# Patient Record
Sex: FEMALE | Race: WHITE | NOT HISPANIC OR LATINO
[De-identification: names, ages, dates, MRNs, and addresses within clinical notes are randomized per-mention and may not be internally consistent; named-entity substitution may affect disease eponyms.]

---

## 2017-05-24 ENCOUNTER — RESULT REVIEW (OUTPATIENT)
Age: 27
End: 2017-05-24

## 2019-10-21 ENCOUNTER — APPOINTMENT (OUTPATIENT)
Dept: ULTRASOUND IMAGING | Facility: CLINIC | Age: 29
End: 2019-10-21
Payer: COMMERCIAL

## 2019-10-21 ENCOUNTER — APPOINTMENT (OUTPATIENT)
Dept: RADIOLOGY | Facility: CLINIC | Age: 29
End: 2019-10-21
Payer: COMMERCIAL

## 2019-10-21 ENCOUNTER — OUTPATIENT (OUTPATIENT)
Dept: OUTPATIENT SERVICES | Facility: HOSPITAL | Age: 29
LOS: 1 days | End: 2019-10-21
Payer: COMMERCIAL

## 2019-10-21 DIAGNOSIS — Z00.8 ENCOUNTER FOR OTHER GENERAL EXAMINATION: ICD-10-CM

## 2019-10-21 PROCEDURE — 71110 X-RAY EXAM RIBS BIL 3 VIEWS: CPT | Mod: 26

## 2019-10-21 PROCEDURE — 71046 X-RAY EXAM CHEST 2 VIEWS: CPT | Mod: 26

## 2019-10-21 PROCEDURE — 76700 US EXAM ABDOM COMPLETE: CPT | Mod: 26

## 2019-10-21 PROCEDURE — 71046 X-RAY EXAM CHEST 2 VIEWS: CPT

## 2019-10-21 PROCEDURE — 71110 X-RAY EXAM RIBS BIL 3 VIEWS: CPT

## 2019-10-21 PROCEDURE — 76700 US EXAM ABDOM COMPLETE: CPT

## 2020-11-09 ENCOUNTER — TRANSCRIPTION ENCOUNTER (OUTPATIENT)
Age: 30
End: 2020-11-09

## 2021-06-10 ENCOUNTER — APPOINTMENT (OUTPATIENT)
Dept: PLASTIC SURGERY | Facility: CLINIC | Age: 31
End: 2021-06-10
Payer: COMMERCIAL

## 2021-06-10 ENCOUNTER — RESULT REVIEW (OUTPATIENT)
Age: 31
End: 2021-06-10

## 2021-06-10 VITALS
HEART RATE: 79 BPM | TEMPERATURE: 98.3 F | SYSTOLIC BLOOD PRESSURE: 136 MMHG | DIASTOLIC BLOOD PRESSURE: 75 MMHG | BODY MASS INDEX: 22.49 KG/M2 | WEIGHT: 135 LBS | OXYGEN SATURATION: 100 % | HEIGHT: 65 IN

## 2021-06-10 DIAGNOSIS — Z98.82 BREAST IMPLANT STATUS: ICD-10-CM

## 2021-06-10 PROCEDURE — 99072 ADDL SUPL MATRL&STAF TM PHE: CPT

## 2021-06-10 PROCEDURE — 99204 OFFICE O/P NEW MOD 45 MIN: CPT

## 2021-06-15 PROBLEM — Z98.82 HISTORY OF BREAST AUGMENTATION: Status: ACTIVE | Noted: 2021-06-15

## 2021-06-16 RX ORDER — ESCITALOPRAM OXALATE 5 MG/1
TABLET, FILM COATED ORAL
Refills: 0 | Status: ACTIVE | COMMUNITY

## 2021-07-08 ENCOUNTER — APPOINTMENT (OUTPATIENT)
Dept: CT IMAGING | Facility: HOSPITAL | Age: 31
End: 2021-07-08
Payer: COMMERCIAL

## 2021-07-08 ENCOUNTER — OUTPATIENT (OUTPATIENT)
Dept: OUTPATIENT SERVICES | Facility: HOSPITAL | Age: 31
LOS: 1 days | End: 2021-07-08
Payer: COMMERCIAL

## 2021-07-08 DIAGNOSIS — R19.02 LEFT UPPER QUADRANT ABDOMINAL SWELLING, MASS AND LUMP: ICD-10-CM

## 2021-07-08 PROCEDURE — 74177 CT ABD & PELVIS W/CONTRAST: CPT | Mod: 26

## 2021-07-08 PROCEDURE — 74177 CT ABD & PELVIS W/CONTRAST: CPT

## 2021-10-21 ENCOUNTER — RESULT REVIEW (OUTPATIENT)
Age: 31
End: 2021-10-21

## 2022-01-12 ENCOUNTER — OUTPATIENT (OUTPATIENT)
Dept: OUTPATIENT SERVICES | Facility: HOSPITAL | Age: 32
LOS: 1 days | End: 2022-01-12
Payer: COMMERCIAL

## 2022-01-12 ENCOUNTER — APPOINTMENT (OUTPATIENT)
Dept: RADIOLOGY | Facility: CLINIC | Age: 32
End: 2022-01-12
Payer: COMMERCIAL

## 2022-01-12 DIAGNOSIS — Z00.8 ENCOUNTER FOR OTHER GENERAL EXAMINATION: ICD-10-CM

## 2022-01-12 PROCEDURE — 71046 X-RAY EXAM CHEST 2 VIEWS: CPT

## 2022-01-12 PROCEDURE — 71046 X-RAY EXAM CHEST 2 VIEWS: CPT | Mod: 26

## 2022-05-09 ENCOUNTER — APPOINTMENT (OUTPATIENT)
Dept: ULTRASOUND IMAGING | Facility: CLINIC | Age: 32
End: 2022-05-09
Payer: COMMERCIAL

## 2022-05-09 PROCEDURE — 76856 US EXAM PELVIC COMPLETE: CPT

## 2022-05-09 PROCEDURE — 76700 US EXAM ABDOM COMPLETE: CPT

## 2022-05-09 PROCEDURE — 76830 TRANSVAGINAL US NON-OB: CPT

## 2022-09-29 ENCOUNTER — APPOINTMENT (OUTPATIENT)
Dept: PLASTIC SURGERY | Facility: CLINIC | Age: 32
End: 2022-09-29

## 2022-09-29 VITALS
WEIGHT: 140 LBS | SYSTOLIC BLOOD PRESSURE: 131 MMHG | HEART RATE: 70 BPM | OXYGEN SATURATION: 99 % | BODY MASS INDEX: 23.32 KG/M2 | HEIGHT: 65 IN | TEMPERATURE: 98.7 F | DIASTOLIC BLOOD PRESSURE: 80 MMHG

## 2022-09-29 DIAGNOSIS — R19.02 LEFT UPPER QUADRANT ABDOMINAL SWELLING, MASS AND LUMP: ICD-10-CM

## 2022-09-29 PROCEDURE — 99214 OFFICE O/P EST MOD 30 MIN: CPT

## 2022-10-18 ENCOUNTER — OUTPATIENT (OUTPATIENT)
Dept: OUTPATIENT SERVICES | Facility: HOSPITAL | Age: 32
LOS: 1 days | End: 2022-10-18
Payer: COMMERCIAL

## 2022-10-18 VITALS
SYSTOLIC BLOOD PRESSURE: 123 MMHG | OXYGEN SATURATION: 100 % | HEIGHT: 65 IN | DIASTOLIC BLOOD PRESSURE: 80 MMHG | RESPIRATION RATE: 12 BRPM | TEMPERATURE: 99 F | HEART RATE: 73 BPM | WEIGHT: 141.54 LBS

## 2022-10-18 DIAGNOSIS — R19.02 LEFT UPPER QUADRANT ABDOMINAL SWELLING, MASS AND LUMP: ICD-10-CM

## 2022-10-18 DIAGNOSIS — Z90.49 ACQUIRED ABSENCE OF OTHER SPECIFIED PARTS OF DIGESTIVE TRACT: Chronic | ICD-10-CM

## 2022-10-18 DIAGNOSIS — Z01.818 ENCOUNTER FOR OTHER PREPROCEDURAL EXAMINATION: ICD-10-CM

## 2022-10-18 DIAGNOSIS — Z98.82 BREAST IMPLANT STATUS: Chronic | ICD-10-CM

## 2022-10-18 PROCEDURE — G0463: CPT

## 2022-10-18 RX ORDER — SODIUM CHLORIDE 9 MG/ML
1000 INJECTION, SOLUTION INTRAVENOUS
Refills: 0 | Status: DISCONTINUED | OUTPATIENT
Start: 2022-10-26 | End: 2022-11-09

## 2022-10-18 NOTE — H&P PST ADULT - RESPIRATORY
normal/clear to auscultation bilaterally/no wheezes/no rales/no rhonchi/no respiratory distress/airway patent/good air movement/respirations non-labored

## 2022-10-18 NOTE — H&P PST ADULT - PROBLEM SELECTOR PLAN 1
excision left abdominal mass possible local flap. surgical wash instructions reviewed and verbalized understanding. may take xanax AM if surgery if needed. covid PCR scheduled for 10/24/22 in [place]

## 2022-10-18 NOTE — H&P PST ADULT - HISTORY OF PRESENT ILLNESS
31 yo female presents with 10 year history of abdominal soft tissue mass. She reports that recently the mass has increased in size and she now has discomfort when exercising.

## 2022-10-18 NOTE — H&P PST ADULT - FALL HARM RISK - UNIVERSAL INTERVENTIONS
Bed in lowest position, wheels locked, appropriate side rails in place/Call bell, personal items and telephone in reach/Instruct patient to call for assistance before getting out of bed or chair/Non-slip footwear when patient is out of bed/Jelm to call system/Physically safe environment - no spills, clutter or unnecessary equipment/Purposeful Proactive Rounding/Room/bathroom lighting operational, light cord in reach

## 2022-10-18 NOTE — H&P PST ADULT - NSANTHOSAYNRD_GEN_A_CORE
neck 14 inches/No. MATILDA screening performed.  STOP BANG Legend: 0-2 = LOW Risk; 3-4 = INTERMEDIATE Risk; 5-8 = HIGH Risk

## 2022-10-18 NOTE — H&P PST ADULT - NSICDXPASTSURGICALHX_GEN_ALL_CORE_FT
PAST SURGICAL HISTORY:  History of breast augmentation 2007 bilateral    History of laparoscopic cholecystectomy 2010

## 2022-10-24 LAB — SARS-COV-2 N GENE NPH QL NAA+PROBE: NOT DETECTED

## 2022-10-25 ENCOUNTER — TRANSCRIPTION ENCOUNTER (OUTPATIENT)
Age: 32
End: 2022-10-25

## 2022-10-25 VITALS
OXYGEN SATURATION: 97 % | TEMPERATURE: 98 F | HEART RATE: 80 BPM | RESPIRATION RATE: 14 BRPM | DIASTOLIC BLOOD PRESSURE: 75 MMHG | HEIGHT: 65 IN | SYSTOLIC BLOOD PRESSURE: 117 MMHG | WEIGHT: 141.54 LBS

## 2022-10-25 NOTE — ASU PATIENT PROFILE, ADULT - NSICDXPASTMEDICALHX_GEN_ALL_CORE_FT
PAST MEDICAL HISTORY:  Anxiety     COVID-19 vaccine series completed     History of COVID-19 December 2021, flu-like symptoms and no hospitalization    History of palpitations anxiety related, echo and EKGnormal    Left upper quadrant abdominal mass

## 2022-10-26 ENCOUNTER — APPOINTMENT (OUTPATIENT)
Dept: PLASTIC SURGERY | Facility: HOSPITAL | Age: 32
End: 2022-10-26

## 2022-10-26 ENCOUNTER — RESULT REVIEW (OUTPATIENT)
Age: 32
End: 2022-10-26

## 2022-10-26 ENCOUNTER — TRANSCRIPTION ENCOUNTER (OUTPATIENT)
Age: 32
End: 2022-10-26

## 2022-10-26 ENCOUNTER — OUTPATIENT (OUTPATIENT)
Dept: OUTPATIENT SERVICES | Facility: HOSPITAL | Age: 32
LOS: 1 days | End: 2022-10-26
Payer: COMMERCIAL

## 2022-10-26 VITALS
OXYGEN SATURATION: 98 % | RESPIRATION RATE: 15 BRPM | TEMPERATURE: 98 F | HEART RATE: 78 BPM | DIASTOLIC BLOOD PRESSURE: 69 MMHG | SYSTOLIC BLOOD PRESSURE: 109 MMHG

## 2022-10-26 DIAGNOSIS — Z90.49 ACQUIRED ABSENCE OF OTHER SPECIFIED PARTS OF DIGESTIVE TRACT: Chronic | ICD-10-CM

## 2022-10-26 DIAGNOSIS — Z98.82 BREAST IMPLANT STATUS: Chronic | ICD-10-CM

## 2022-10-26 DIAGNOSIS — R19.02 LEFT UPPER QUADRANT ABDOMINAL SWELLING, MASS AND LUMP: ICD-10-CM

## 2022-10-26 PROBLEM — Z86.16 PERSONAL HISTORY OF COVID-19: Chronic | Status: ACTIVE | Noted: 2022-10-18

## 2022-10-26 PROBLEM — F41.9 ANXIETY DISORDER, UNSPECIFIED: Chronic | Status: ACTIVE | Noted: 2022-10-18

## 2022-10-26 PROBLEM — Z87.898 PERSONAL HISTORY OF OTHER SPECIFIED CONDITIONS: Chronic | Status: ACTIVE | Noted: 2022-10-18

## 2022-10-26 PROBLEM — Z92.29 PERSONAL HISTORY OF OTHER DRUG THERAPY: Chronic | Status: ACTIVE | Noted: 2022-10-18

## 2022-10-26 PROCEDURE — 88302 TISSUE EXAM BY PATHOLOGIST: CPT

## 2022-10-26 PROCEDURE — 14000 TIS TRNFR TRUNK 10 SQ CM/<: CPT

## 2022-10-26 PROCEDURE — 49560: CPT | Mod: AS

## 2022-10-26 PROCEDURE — 49561: CPT

## 2022-10-26 PROCEDURE — 88302 TISSUE EXAM BY PATHOLOGIST: CPT | Mod: 26

## 2022-10-26 PROCEDURE — 49568: CPT

## 2022-10-26 RX ORDER — ESCITALOPRAM OXALATE 10 MG/1
1 TABLET, FILM COATED ORAL
Qty: 0 | Refills: 0 | DISCHARGE

## 2022-10-26 RX ORDER — SODIUM CHLORIDE 9 MG/ML
1000 INJECTION, SOLUTION INTRAVENOUS
Refills: 0 | Status: DISCONTINUED | OUTPATIENT
Start: 2022-10-26 | End: 2022-10-26

## 2022-10-26 RX ORDER — ALPRAZOLAM 0.25 MG
1 TABLET ORAL
Qty: 0 | Refills: 0 | DISCHARGE

## 2022-10-26 RX ORDER — TRAMADOL HYDROCHLORIDE 50 MG/1
50 TABLET ORAL ONCE
Refills: 0 | Status: DISCONTINUED | OUTPATIENT
Start: 2022-10-26 | End: 2022-10-26

## 2022-10-26 RX ORDER — NORETHINDRONE AND ETHINYL ESTRADIOL 0.4-0.035
1 KIT ORAL
Qty: 0 | Refills: 0 | DISCHARGE

## 2022-10-26 RX ORDER — SODIUM CHLORIDE 9 MG/ML
1000 INJECTION, SOLUTION INTRAVENOUS
Refills: 0 | Status: DISCONTINUED | OUTPATIENT
Start: 2022-10-26 | End: 2022-11-09

## 2022-10-26 RX ORDER — HYDROMORPHONE HYDROCHLORIDE 2 MG/ML
0.5 INJECTION INTRAMUSCULAR; INTRAVENOUS; SUBCUTANEOUS
Refills: 0 | Status: DISCONTINUED | OUTPATIENT
Start: 2022-10-26 | End: 2022-10-26

## 2022-10-26 RX ORDER — ONDANSETRON 8 MG/1
4 TABLET, FILM COATED ORAL ONCE
Refills: 0 | Status: COMPLETED | OUTPATIENT
Start: 2022-10-26 | End: 2022-10-26

## 2022-10-26 RX ADMIN — ONDANSETRON 4 MILLIGRAM(S): 8 TABLET, FILM COATED ORAL at 13:07

## 2022-10-26 NOTE — ASU DISCHARGE PLAN (ADULT/PEDIATRIC) - CARE PROVIDER_API CALL
Ariela Cline)  Physician Assistant Services  [address] [facility], [address]  [place] [address]  Phone: [phone]  Fax: [phone]  Scheduled Appointment: 11/03/2022 03:30 PM

## 2022-10-26 NOTE — ASU DISCHARGE PLAN (ADULT/PEDIATRIC) - PROCEDURE
Excisional biopsy of abdominal mass - by Dr. Madrigal ([phone]). Excisional biopsy of abdominal mass - by Dr. Madrigal ([phone]).

## 2022-10-26 NOTE — ASU DISCHARGE PLAN (ADULT/PEDIATRIC) - ASU DC SPECIAL INSTRUCTIONSFT
1. Please follow up with Dr. Madrigal's PA, Clemencia, next week THURSDAY 11/03/22 3:30pm for your first post operative visit. Your appointment has already been made. Please call the office if you need to make any changes to your appointment at [phone] (during normal business hours M-F 9-5pm).   -  2. Activity:   Please avoid any strenuous activities, AVOID exercise, stretching, reaching overhead, or any heavy lifting.  -  3. Dressings:   Some OOZING and blood on the dressing is normal and to be expected. If it becomes saturated w/ BRIGHT red blood that does not stop, please call [phone] for email CLEMENCIA: [email or web address].  -  4. Medications:  Your medications were sent to your pharmacy.  Please take the prescribed medications as directed.   For MILD pain please take regular over the counter pain medications such as: Advil, Tylenol, Motrin.   Only take the narcotic prescribed pain medication for SEVERE PAIN.   If constipation occurs after taking narcotic pain medications, please take an over the counter stool softener.

## 2022-10-26 NOTE — ASU DISCHARGE PLAN (ADULT/PEDIATRIC) - BATHING
you may take quick showers starting tomorrow because the dressing you have on is waterproof./No change

## 2022-10-26 NOTE — ASU DISCHARGE PLAN (ADULT/PEDIATRIC) - PAIN MANAGEMENT
Prescriptions electronically submitted to pharmacy from [facility] Percocet 5/325mg, Q6 PRN./Prescriptions electronically submitted to pharmacy from [facility]

## 2022-10-26 NOTE — ASU DISCHARGE PLAN (ADULT/PEDIATRIC) - COMMENTS
Dr. Madrigal's  direct phone number is [phone]. If after office hours (9-5PM M-F), then please wait until normal business hours to call.   You may leave a detailed VM as well. You may also email [email or web address] for any concerns or questions regarding scheduling appointments.  You will see Dr. Madrigal's NEAL Khanna, (Ariela DE JESUS) for your post operative visit.  You may also contact her directly via email: [email or web address] for any concerns or questions.

## 2022-11-01 LAB — SURGICAL PATHOLOGY STUDY: SIGNIFICANT CHANGE UP

## 2022-11-03 ENCOUNTER — APPOINTMENT (OUTPATIENT)
Dept: PLASTIC SURGERY | Facility: CLINIC | Age: 32
End: 2022-11-03

## 2022-11-03 VITALS
HEART RATE: 79 BPM | BODY MASS INDEX: 23.32 KG/M2 | DIASTOLIC BLOOD PRESSURE: 63 MMHG | OXYGEN SATURATION: 97 % | HEIGHT: 65 IN | TEMPERATURE: 98 F | SYSTOLIC BLOOD PRESSURE: 127 MMHG | WEIGHT: 140 LBS

## 2022-11-03 DIAGNOSIS — K46.9 UNSPECIFIED ABDOMINAL HERNIA W/OUT OBSTRUCTION OR GANGRENE: ICD-10-CM

## 2022-11-03 PROCEDURE — 99024 POSTOP FOLLOW-UP VISIT: CPT

## 2022-11-21 ENCOUNTER — NON-APPOINTMENT (OUTPATIENT)
Age: 32
End: 2022-11-21

## 2022-11-22 ENCOUNTER — APPOINTMENT (OUTPATIENT)
Dept: PLASTIC SURGERY | Facility: CLINIC | Age: 32
End: 2022-11-22

## 2023-04-02 ENCOUNTER — NON-APPOINTMENT (OUTPATIENT)
Age: 33
End: 2023-04-02

## 2023-12-18 NOTE — ASU DISCHARGE PLAN (ADULT/PEDIATRIC) - ACTIVITY LEVEL
Subjective   Patient ID: Roney is a 49 year old male.  Referring provider is Markos Guerrero MD.    Chief Complaint   Patient presents with    Follow-up     S/p Right lateral suboccipital craniectomy for resection of mass lesion done at [facility] on 7/5/23 (166d). Here to review MRI brain done 12/11/23 through [facility].    Office Visit     Denies any headaches, vomiting, changes to smell, ringing in ears, changes to speech or thinking. He reports some nausea. He still taking Decadron.     Pt here with family for f/u to review most recent MRI brain, no HA, N/V or weakness        Patient's medications, allergies, past medical, surgical, social and family histories were reviewed and updated as appropriate.    Review of Systems   All other systems reviewed and are negative.      Objective   Physical Exam  Vitals and nursing note reviewed. Exam conducted with a chaperone present.   Constitutional:       General: He is awake.      Appearance: Normal appearance.   HENT:      Neck: Full passive range of motion without pain.   Musculoskeletal:      Lumbar back: Normal.   Neurological:      Mental Status: He is alert and oriented to person, place, and time.      Sensory: Sensation is intact.      Motor: Motor function is intact.      Coordination: Romberg sign positive.      Gait: Gait abnormal.      Deep Tendon Reflexes: Reflexes are normal and symmetric.       Neurological Exam  Mental Status  Awake and alert. Oriented to person, place, and time.    Sensory  Normal sensation.    Reflexes  Deep tendon reflexes are 2+ and symmetric in all four extremities.    Gait   Abnormal gait. Romberg is present.      Imaging/Labs  I reviewed MRI brain Dec 2023 compared to Nov 2023 showing stable post op findings in right posterior fossa, smaller size of known pontine lesion dn satellite cerebellar lesion compared to prior c/w treatment effect form chemo and XRT, stable size right medial temporal lesion    Assessment   Problem List Items Addressed  This Visit          Hematology and Neoplasia    Metastatic cancer to brain (CMD) - Primary     Pt with stable appearing medial right temporal lobe CE cystic lesion  DDX metastasis lesion, abscess, primary CNS tumor  Given his h/o of known metastatic colon CA it is most likely another location of metastatic disease  I do not recommend biopsy given stability of the lesion of serial imaging  I recommend continued serial image for f/u in 4-6 weeks  Pt counseled to return if new symptoms develop         Relevant Orders    MRI BRAIN W WO CONTRAST     .   No excercise/No heavy lifting/No sports/gym/No tub baths/No intercourse please avoid sleeping on your stomach./No excercise/No heavy lifting/No sports/gym/No tub baths/No intercourse

## 2023-12-22 PROBLEM — Z00.00 ENCOUNTER FOR PREVENTIVE HEALTH EXAMINATION: Status: ACTIVE | Noted: 2023-12-22

## 2024-02-12 ENCOUNTER — APPOINTMENT (OUTPATIENT)
Dept: INTERNAL MEDICINE | Facility: CLINIC | Age: 34
End: 2024-02-12

## 2024-09-11 ENCOUNTER — APPOINTMENT (OUTPATIENT)
Dept: PULMONOLOGY | Facility: CLINIC | Age: 34
End: 2024-09-11
Payer: COMMERCIAL

## 2024-09-11 VITALS
OXYGEN SATURATION: 95 % | WEIGHT: 140 LBS | HEART RATE: 80 BPM | DIASTOLIC BLOOD PRESSURE: 80 MMHG | BODY MASS INDEX: 23.32 KG/M2 | SYSTOLIC BLOOD PRESSURE: 116 MMHG | HEIGHT: 65 IN

## 2024-09-11 DIAGNOSIS — R05.9 COUGH, UNSPECIFIED: ICD-10-CM

## 2024-09-11 LAB — POCT - HEMOGLOBIN (HGB), QUANTITATIVE, TRANSCUTANEOUS: 13.4

## 2024-09-11 PROCEDURE — 94010 BREATHING CAPACITY TEST: CPT

## 2024-09-11 PROCEDURE — 88738 HGB QUANT TRANSCUTANEOUS: CPT

## 2024-09-11 PROCEDURE — ZZZZZ: CPT

## 2024-09-11 PROCEDURE — 99204 OFFICE O/P NEW MOD 45 MIN: CPT | Mod: 25

## 2024-09-11 PROCEDURE — 94727 GAS DIL/WSHOT DETER LNG VOL: CPT

## 2024-09-11 PROCEDURE — 94729 DIFFUSING CAPACITY: CPT

## 2024-09-11 RX ORDER — FLUTICASONE FUROATE 200 UG/1
200 POWDER RESPIRATORY (INHALATION) DAILY
Qty: 2 | Refills: 2 | Status: ACTIVE | COMMUNITY
Start: 2024-09-11 | End: 1900-01-01

## 2024-09-11 NOTE — PROCEDURE
[FreeTextEntry1] : cxr at  reportedly normal per pt  PFT: Normal spirometry.  Lung volumes normal. DLCO normal.    Reviewed:  EXAM: XR CHEST PA LAT 2V   PROCEDURE DATE: 01/12/2022    INTERPRETATION: PA and lateral chest radiographs  COMPARISON: 10/21/2019 chest.  CLINICAL INFORMATION: Cough, shortness of breath.  FINDINGS:  The airway is midline. There are no airspace consolidations. No pleural effusion or pneumothorax.  The heart size and mediastinum configuration are within the limits of normal.  The visualized osseus structures are intact.  IMPRESSION:  No acute radiographic cardiopulmonary pathology.  --- End of Report ---       TWAN BERUMEN MD; Attending Radiologist This document has been electronically signed. Jan 12 2022 8:50PM

## 2024-09-11 NOTE — ASSESSMENT
[FreeTextEntry1] : trial of ICS with rinsing if no improvement would add back nasal spray since she does suffer from intermittent pnd, also could consider singulair.  fu 2 weeks  A total of 45 minutes was spent on this encounter including history taking, chart review, physical examination, testing interpretation and treatment plan.

## 2024-09-11 NOTE — HISTORY OF PRESENT ILLNESS
[Never] : never [TextBox_4] : 34F cough x 3 mo  uc few times--steroids, abx, nose spray, albuterol, cough syrup still coughing barking  no sob or wheeze albuterol made her shakey no asthma as a child but had bronchitis a lot in college no smoking no fam members with lung disease has some PND, no gerd

## 2024-09-25 ENCOUNTER — APPOINTMENT (OUTPATIENT)
Dept: PULMONOLOGY | Facility: CLINIC | Age: 34
End: 2024-09-25

## 2024-10-15 ENCOUNTER — APPOINTMENT (OUTPATIENT)
Dept: OBGYN | Facility: CLINIC | Age: 34
End: 2024-10-15
Payer: COMMERCIAL

## 2024-10-15 PROCEDURE — 99385 PREV VISIT NEW AGE 18-39: CPT

## 2024-10-15 PROCEDURE — 36415 COLL VENOUS BLD VENIPUNCTURE: CPT

## 2024-10-15 PROCEDURE — 99212 OFFICE O/P EST SF 10 MIN: CPT | Mod: 25

## 2024-10-15 PROCEDURE — 99459 PELVIC EXAMINATION: CPT

## 2025-02-10 ENCOUNTER — APPOINTMENT (OUTPATIENT)
Dept: OBGYN | Facility: CLINIC | Age: 35
End: 2025-02-10
Payer: COMMERCIAL

## 2025-02-10 PROCEDURE — 76830 TRANSVAGINAL US NON-OB: CPT

## 2025-02-10 PROCEDURE — 36415 COLL VENOUS BLD VENIPUNCTURE: CPT

## 2025-02-10 PROCEDURE — 99213 OFFICE O/P EST LOW 20 MIN: CPT | Mod: 25

## 2025-02-10 PROCEDURE — 76856 US EXAM PELVIC COMPLETE: CPT

## 2025-02-28 ENCOUNTER — OUTPATIENT (OUTPATIENT)
Dept: OUTPATIENT SERVICES | Facility: HOSPITAL | Age: 35
LOS: 1 days | End: 2025-02-28
Payer: COMMERCIAL

## 2025-02-28 VITALS
RESPIRATION RATE: 20 BRPM | HEART RATE: 86 BPM | SYSTOLIC BLOOD PRESSURE: 131 MMHG | TEMPERATURE: 99 F | DIASTOLIC BLOOD PRESSURE: 82 MMHG | OXYGEN SATURATION: 100 % | WEIGHT: 145.95 LBS | HEIGHT: 65 IN

## 2025-02-28 DIAGNOSIS — R10.2 PELVIC AND PERINEAL PAIN: ICD-10-CM

## 2025-02-28 DIAGNOSIS — N83.209 UNSPECIFIED OVARIAN CYST, UNSPECIFIED SIDE: ICD-10-CM

## 2025-02-28 DIAGNOSIS — Z98.82 BREAST IMPLANT STATUS: Chronic | ICD-10-CM

## 2025-02-28 DIAGNOSIS — Z01.818 ENCOUNTER FOR OTHER PREPROCEDURAL EXAMINATION: ICD-10-CM

## 2025-02-28 DIAGNOSIS — Z90.49 ACQUIRED ABSENCE OF OTHER SPECIFIED PARTS OF DIGESTIVE TRACT: Chronic | ICD-10-CM

## 2025-02-28 PROCEDURE — G0463: CPT

## 2025-02-28 RX ORDER — CEFAZOLIN SODIUM IN 0.9 % NACL 3 G/100 ML
2000 INTRAVENOUS SOLUTION, PIGGYBACK (ML) INTRAVENOUS ONCE
Refills: 0 | Status: COMPLETED | OUTPATIENT
Start: 2025-03-19 | End: 2025-03-19

## 2025-02-28 RX ORDER — LIDOCAINE HCL/PF 10 MG/ML
0.2 VIAL (ML) INJECTION ONCE
Refills: 0 | Status: DISCONTINUED | OUTPATIENT
Start: 2025-03-19 | End: 2025-04-02

## 2025-02-28 RX ORDER — SODIUM CHLORIDE 9 G/1000ML
1000 INJECTION, SOLUTION INTRAVENOUS
Refills: 0 | Status: DISCONTINUED | OUTPATIENT
Start: 2025-03-19 | End: 2025-04-02

## 2025-02-28 NOTE — H&P PST ADULT - ASSESSMENT
Dasi activities: does house chores, uses her [employer] 3x/wk for 45 mins   Dasi score: 8.97  Patient denies any loose or broken tooth

## 2025-02-28 NOTE — H&P PST ADULT - NSICDXPASTMEDICALHX_GEN_ALL_CORE_FT
PAST MEDICAL HISTORY:  Anxiety     COVID-19 vaccine series completed     History of COVID-19 December 2021, flu-like symptoms and no hospitalization    History of palpitations anxiety related, echo and EKGnormal    Left upper quadrant abdominal mass     Pelvic and perineal pain

## 2025-02-28 NOTE — H&P PST ADULT - ATTENDING COMMENTS
Diagnostic laparoscopy for pelvic pain, small R ovarian cyst, rule out endometriosis. Possible ovarian cystectomy, possible excision of endometriosis if found. She has been TTC for over 6 months with no success, we will also perform chromopertubation to assess tubal patency.   Consent obtained  Kim Rivers

## 2025-02-28 NOTE — H&P PST ADULT - PROBLEM SELECTOR PLAN 1
Scheduled for diagnostic laparoscopy/ ovarian cystectomy/ chromopertubation   preop instruction provided with good understanding   UCG on admit   labs result on HIE, reviewed

## 2025-02-28 NOTE — H&P PST ADULT - ALCOHOL USE HISTORY SINGLE SELECT
Documentation clarification is required for accuracy in coding and billing, claim validation and reporting severity of illness, quality data and risk of mortality.
yes...

## 2025-02-28 NOTE — H&P PST ADULT - HISTORY OF PRESENT ILLNESS
34 year old female with h/o anxiety disorder, presents to [facility] for scheduled diagnostic laparoscopy/ ovarian cystectomy/ chromopertubation for pelvic and perineal pain on 3/19/2025.

## 2025-02-28 NOTE — H&P PST ADULT - NSANTHOSAYNRD_GEN_A_CORE
No. MATILDA screening performed.  STOP BANG Legend: 0-2 = LOW Risk; 3-4 = INTERMEDIATE Risk; 5-8 = HIGH Risk

## 2025-02-28 NOTE — H&P PST ADULT - PRO INTERPRETER NEED 2

## 2025-03-19 ENCOUNTER — RESULT REVIEW (OUTPATIENT)
Age: 35
End: 2025-03-19

## 2025-03-19 ENCOUNTER — TRANSCRIPTION ENCOUNTER (OUTPATIENT)
Age: 35
End: 2025-03-19

## 2025-03-19 ENCOUNTER — OUTPATIENT (OUTPATIENT)
Dept: OUTPATIENT SERVICES | Facility: HOSPITAL | Age: 35
LOS: 1 days | End: 2025-03-19
Payer: COMMERCIAL

## 2025-03-19 ENCOUNTER — APPOINTMENT (OUTPATIENT)
Dept: OBGYN | Facility: HOSPITAL | Age: 35
End: 2025-03-19

## 2025-03-19 VITALS
SYSTOLIC BLOOD PRESSURE: 114 MMHG | TEMPERATURE: 97 F | DIASTOLIC BLOOD PRESSURE: 77 MMHG | WEIGHT: 145.95 LBS | HEART RATE: 88 BPM | HEIGHT: 65 IN | RESPIRATION RATE: 16 BRPM | OXYGEN SATURATION: 98 %

## 2025-03-19 VITALS
DIASTOLIC BLOOD PRESSURE: 65 MMHG | RESPIRATION RATE: 15 BRPM | OXYGEN SATURATION: 99 % | HEART RATE: 93 BPM | TEMPERATURE: 97 F | SYSTOLIC BLOOD PRESSURE: 102 MMHG

## 2025-03-19 DIAGNOSIS — Z98.82 BREAST IMPLANT STATUS: Chronic | ICD-10-CM

## 2025-03-19 DIAGNOSIS — Z90.49 ACQUIRED ABSENCE OF OTHER SPECIFIED PARTS OF DIGESTIVE TRACT: Chronic | ICD-10-CM

## 2025-03-19 DIAGNOSIS — N83.209 UNSPECIFIED OVARIAN CYST, UNSPECIFIED SIDE: ICD-10-CM

## 2025-03-19 DIAGNOSIS — R10.2 PELVIC AND PERINEAL PAIN: ICD-10-CM

## 2025-03-19 LAB
BLD GP AB SCN SERPL QL: NEGATIVE — SIGNIFICANT CHANGE UP
RH IG SCN BLD-IMP: POSITIVE — SIGNIFICANT CHANGE UP
RH IG SCN BLD-IMP: POSITIVE — SIGNIFICANT CHANGE UP

## 2025-03-19 PROCEDURE — 86901 BLOOD TYPING SEROLOGIC RH(D): CPT

## 2025-03-19 PROCEDURE — 58350 REOPEN FALLOPIAN TUBE: CPT | Mod: XU

## 2025-03-19 PROCEDURE — 88305 TISSUE EXAM BY PATHOLOGIST: CPT

## 2025-03-19 PROCEDURE — C9399: CPT

## 2025-03-19 PROCEDURE — 58350 REOPEN FALLOPIAN TUBE: CPT | Mod: 50

## 2025-03-19 PROCEDURE — 88305 TISSUE EXAM BY PATHOLOGIST: CPT | Mod: 26

## 2025-03-19 PROCEDURE — C1889: CPT

## 2025-03-19 PROCEDURE — 49321 LAPAROSCOPY BIOPSY: CPT

## 2025-03-19 PROCEDURE — 58662 LAPAROSCOPY EXCISE LESIONS: CPT

## 2025-03-19 PROCEDURE — 86900 BLOOD TYPING SEROLOGIC ABO: CPT

## 2025-03-19 PROCEDURE — 58925 REMOVAL OF OVARIAN CYST(S): CPT

## 2025-03-19 PROCEDURE — 86850 RBC ANTIBODY SCREEN: CPT

## 2025-03-19 DEVICE — SURGICEL POWDER 3 GRAMS: Type: IMPLANTABLE DEVICE | Status: FUNCTIONAL

## 2025-03-19 RX ORDER — OXYCODONE HYDROCHLORIDE 30 MG/1
1 TABLET ORAL
Qty: 5 | Refills: 0
Start: 2025-03-19

## 2025-03-19 RX ORDER — ONDANSETRON HCL/PF 4 MG/2 ML
4 VIAL (ML) INJECTION ONCE
Refills: 0 | Status: DISCONTINUED | OUTPATIENT
Start: 2025-03-19 | End: 2025-04-02

## 2025-03-19 RX ORDER — GABAPENTIN 400 MG/1
600 CAPSULE ORAL ONCE
Refills: 0 | Status: COMPLETED | OUTPATIENT
Start: 2025-03-19 | End: 2025-03-19

## 2025-03-19 RX ORDER — APREPITANT 40 MG/1
40 CAPSULE ORAL ONCE
Refills: 0 | Status: COMPLETED | OUTPATIENT
Start: 2025-03-19 | End: 2025-03-19

## 2025-03-19 RX ORDER — CELECOXIB 50 MG/1
400 CAPSULE ORAL ONCE
Refills: 0 | Status: COMPLETED | OUTPATIENT
Start: 2025-03-19 | End: 2025-03-19

## 2025-03-19 RX ADMIN — SODIUM CHLORIDE 100 MILLILITER(S): 9 INJECTION, SOLUTION INTRAVENOUS at 10:51

## 2025-03-19 RX ADMIN — Medication 1 APPLICATION(S): at 10:51

## 2025-03-19 RX ADMIN — GABAPENTIN 600 MILLIGRAM(S): 400 CAPSULE ORAL at 10:51

## 2025-03-19 RX ADMIN — SODIUM CHLORIDE 100 MILLILITER(S): 9 INJECTION, SOLUTION INTRAVENOUS at 14:47

## 2025-03-19 RX ADMIN — APREPITANT 40 MILLIGRAM(S): 40 CAPSULE ORAL at 11:52

## 2025-03-19 RX ADMIN — CELECOXIB 400 MILLIGRAM(S): 50 CAPSULE ORAL at 10:51

## 2025-03-19 RX ADMIN — CELECOXIB 400 MILLIGRAM(S): 50 CAPSULE ORAL at 11:42

## 2025-03-19 NOTE — ASU DISCHARGE PLAN (ADULT/PEDIATRIC) - CARE PROVIDER_API CALL
Kim Rivers  Obstetrics and Gynecology  [address] [place] [address], [address]  [place] [address]  Phone: [phone]  Fax: [phone]  Follow Up Time: 2 weeks

## 2025-03-19 NOTE — ASU DISCHARGE PLAN (ADULT/PEDIATRIC) - FINANCIAL ASSISTANCE
[facility] provides services at a reduced cost to those who are determined to be eligible through [facility]’s financial assistance program. Information regarding [facility]’s financial assistance program can be found by going to [email or web address] or by calling [phone].

## 2025-03-19 NOTE — ASU DISCHARGE PLAN (ADULT/PEDIATRIC) - CALL YOUR DOCTOR IF YOU HAVE ANY OF THE FOLLOWING:
Bleeding that does not stop/Swelling that gets worse/Pain not relieved by Medications/Fever greater than (need to indicate Fahrenheit or Celsius)/Wound/Surgical Site with redness, or foul smelling discharge or pus/Nausea and vomiting that does not stop/Unable to urinate/Inability to tolerate liquids or foods English

## 2025-03-19 NOTE — BRIEF OPERATIVE NOTE - OPERATION/FINDINGS
Normal external genitalia, cervix wnl.  Diagnostic laparoscopy demonstrating normal 6 weeks sized uterus with normal BL fallopian tubes. Left ovary with 3mm chocolate cyst like lesion. Right ovary with 3cm fluid filled cyst. Right ovarian cystectomy preformed uncomplicated. Multiple pelvic peritoneal biopsies taken concerning for endometriosis. Chromopertubation demonstration BL patent tubes with immediate response to flow. Surgicel placed over biopsy sites.  Normal external genitalia, cervix wnl.  Diagnostic laparoscopy demonstrating normal 6 weeks sized uterus with normal BL fallopian tubes. Left ovary with 3mm chocolate cyst like lesion. Right ovary with 3cm fluid filled cyst, suspect follicular but cystectomy performed. Right ovarian cystectomy preformed uncomplicated. Multiple pelvic peritoneal biopsies taken concerning for endometriosis. Chromopertubation demonstration BL patent tubes with immediate response to flow. Surgicel placed over biopsy sites.

## 2025-03-19 NOTE — BRIEF OPERATIVE NOTE - NSICDXBRIEFPROCEDURE_GEN_ALL_CORE_FT
PROCEDURES:  Diagnostic laparoscopy 19-Mar-2025 13:54:49  Chente Saab  Cystectomy, ovarian, laparoscopic 19-Mar-2025 13:55:05 right sided Chente Saab  Laparoscopic excision, peritoneal lesion 19-Mar-2025 13:56:36  Chente Saab  Chromopertubation, fallopian tube, laparoscopic 19-Mar-2025 13:57:32  Cehnte Saab

## 2025-03-19 NOTE — ASU PREOP CHECKLIST - WAS PATIENT ON BETA BLOCKER?
[facility]  Speech Language Pathology  Virtual Visit Pediatric Daily Note    Bailey Solorio  : 2016     Date: 2020      Visit Information:  SLP Insurance Information: [employer]  Total # of Visits Approved: (2020)  Total # of Visits to Date: 17  No Show: 3  Canceled Appointment: 7    Next Progress Note Due: 2020      Pursuant to the emergency declaration under the [address] [place] [address], [facility] waiver authority and the [employer] Act, this Virtual Visit was conducted, with patient's consent, to reduce the patient's risk of exposure to COVID-19 and provide continuity of care for an established patient. Services were provided through a video synchronous discussion virtually to substitute for in-person clinic visit. This Doxy virtual visit was completed with provider located at [facility] and the patient located at home. Therapist reviewed Consent for Telehealth Services document with patient/guardian: Your Provider(s) have discussed the use of Telehealth and the Service with you, including the benefits and risks of such and you have provided oral consent to your Provider(s) for the use of Telehealth and the Service. Patient/guardian Verbalized consent. Patient/guardian Declined paper copy of consent form. Interventions used this date:  Speech Production      Subjective:  Patient was pleasant and cooperative throughout session. Patient required min cues to look at screen for visual cues but did well with reinforcement activities with visuals (I.e. putting candles on the cake, popping virtual bubbles). Behavior:  Alert and Cooperative    Objective/Assessment:   Patient progressing towards goals:     1.[place] will eliminate the phonological process of fronting at the sentence level with 70% accuracy following a model with mod cues to increase her No

## 2025-03-19 NOTE — ASU DISCHARGE PLAN (ADULT/PEDIATRIC) - NURSING INSTRUCTIONS
OK to take Tylenol/Acetaminophen at 6'45pm  for pain and every 6 hours after as needed. OK to take Motrin/Ibuprofen at 5pm  for pain and every 6 hours after as needed.

## 2025-03-19 NOTE — ASU DISCHARGE PLAN (ADULT/PEDIATRIC) - ASU DC SPECIAL INSTRUCTIONSFT
After your surgery it is normal to experience:    •	Vaginal bleeding- can last 1-2 weeks and should not be heavier than a period. It may come and go and be red, brown or pink. Use pads, not tampons.  •	Cramping- Is common especially within the first 24 hours. This should be relieved by taking over the counter motrin, and/or Tylenol.  •	Vaginal soreness/irritation- can occur in the first few days after surgery because of the instruments that were used in the vagina. Soreness can be treated with ice pack and irritation can be taken care of with an emollient such as balmex or aquaphor that you can put directly on the irritated area.    Restrictions: For 10-14 days after the surgery you should avoid the following:    •	Tampons  •	Sex  •	Vigorous gym exercise  •	Swimming  pools and tub baths  •	Wait a day or two before going back to work    Anesthesia Precautions:  For the next 12 hours do not:   •	drive a car,  •	 operate power tools or machinery,  •	drink alcohol, beer, or wine,   •	make important personal or business decisions    Diet:   •	Resume Regular diet but Progress diet slowly     Physician Notification- Warning signs to look out for  •	Heavy Vaginal Bleeding   •	Shortness of breath or chest pain  •	Severe Abdominal Pain  •	Persistent nausea and vomiting  •	Pain not relieved by medications  •	Fever greater than 100.4®F  •	Inability to tolerate liquids or foods  •	Unable to urinate after 8 hours

## 2025-03-20 PROBLEM — R10.2 PELVIC AND PERINEAL PAIN: Chronic | Status: ACTIVE | Noted: 2025-02-28

## 2025-04-01 ENCOUNTER — APPOINTMENT (OUTPATIENT)
Dept: OBGYN | Facility: CLINIC | Age: 35
End: 2025-04-01
Payer: COMMERCIAL

## 2025-04-01 PROCEDURE — 99024 POSTOP FOLLOW-UP VISIT: CPT

## (undated) DEVICE — BLADE SURGICAL #15 CARBON

## (undated) DEVICE — NDL HYPO REGULAR BEVEL 25G X 1.5" (BLUE)

## (undated) DEVICE — SYR LUER LOK 10CC

## (undated) DEVICE — PREP CHLORAPREP HI-LITE ORANGE 26ML

## (undated) DEVICE — D HELP - CLEARVIEW CLEARIFY SYSTEM

## (undated) DEVICE — INSUFFLATION NDL ETHICON ENDOPATH PNEUMOPARITONEUM 120MM

## (undated) DEVICE — SUT MONOSOF 4-0 18" C-13

## (undated) DEVICE — SUT POLYSORB 2-0 30" V-20 UNDYED

## (undated) DEVICE — SOL IRR POUR H2O 1000ML

## (undated) DEVICE — DRAPE GENERAL ENDOSCOPY

## (undated) DEVICE — PREP DYNA-HEX CHG 4% 4OZ BOTTLE (BACTOSHIELD)

## (undated) DEVICE — TROCAR COVIDIEN VERSAPORT BLADELESS OPTICAL 5MM STANDARD

## (undated) DEVICE — BLANKET WARMER UPPER ADULT

## (undated) DEVICE — GLV 6.5 PROTEXIS

## (undated) DEVICE — DRAPE 1/2 SHEET 40X57"

## (undated) DEVICE — SOL IRR POUR NS 0.9% 500ML

## (undated) DEVICE — DRAPE HALF SHEET 40X57"

## (undated) DEVICE — SUT VICRYL PLUS 4-0 18" PS-2 UNDYED

## (undated) DEVICE — TUBING STRYKEFLOW II SUCTION / IRRIGATOR

## (undated) DEVICE — DRSG OPSITE 2.5 X 2"

## (undated) DEVICE — GLV 6.5 PROTEXIS (WHITE)

## (undated) DEVICE — BLANKET WARMER LOWER ADULT

## (undated) DEVICE — SUT VICRYL 0 27" CT-1

## (undated) DEVICE — DRAPE LIGHT HANDLE COVER FLEX GREEN

## (undated) DEVICE — VENODYNE/SCD SLEEVE CALF MEDIUM

## (undated) DEVICE — GLV 7.5 ULTRAFREE MAX

## (undated) DEVICE — LIGASURE MARYLAND 5MM X 37CM

## (undated) DEVICE — ELCTR LAPAROSCOPIC MONOPOLAR CORD

## (undated) DEVICE — SUT MONOCRYL 3-0 27" PS-2 UNDYED

## (undated) DEVICE — INSUFFLATION NDL COVIDIEN STEP 14G SHORT FOR STEP/VERSASTEP

## (undated) DEVICE — LIGASURE BLUNT TIP 5MM X 37CM

## (undated) DEVICE — SYR CONTROL LUER LOK 10CC

## (undated) DEVICE — ELCTR PENCIL SMOKE EVACUATION

## (undated) DEVICE — SOL IRR BAG NS 0.9% 3000ML

## (undated) DEVICE — POSITIONER STRAP ARMBOARD VELCRO TS-30 12

## (undated) DEVICE — DRAPE TOWEL BLUE 17" X 24"

## (undated) DEVICE — POSITIONER FOAM HEAD CRADLE

## (undated) DEVICE — APPLICATOR SURGICEL LAP TROCAR POINT 2.5MM X 150MM

## (undated) DEVICE — ENDOCATCH II 15MM

## (undated) DEVICE — FOLEY TRAY 16FR 5CC LF UMETER CLOSED

## (undated) DEVICE — SOL IRR POUR NS 0.9% 1000ML

## (undated) DEVICE — STAPLER SKIN VISI-STAT 35 WIDE

## (undated) DEVICE — SUT NYLON 3-0 18" PS-2

## (undated) DEVICE — PACK GYN LAPAROSCOPY

## (undated) DEVICE — WARMING BLANKET UPPER ADULT

## (undated) DEVICE — CONTAINER SPECIMEN PET

## (undated) DEVICE — SUT POLYSORB 3-0 30" V-20 UNDYED

## (undated) DEVICE — TROCAR COVIDIEN VERSAONE FIXATION CANNULA 5MM

## (undated) DEVICE — TRAP SPECIMEN SPUTUM 40CC

## (undated) DEVICE — ENDOCATCH 10MM

## (undated) DEVICE — PACK MINOR

## (undated) DEVICE — TUBING INSUFFLATION LAP FILTER 10FT

## (undated) DEVICE — SUT ETHILON 3-0 18" PS-1

## (undated) DEVICE — DRAPE SPLIT SHEETS 77X108"

## (undated) DEVICE — DRAPE INSTRUMENT POUCH

## (undated) DEVICE — PREP TRAY DRY SKIN PREP SCRUB

## (undated) DEVICE — NSA-VIDEO TOWER - STRYKER: Type: DURABLE MEDICAL EQUIPMENT

## (undated) DEVICE — SOL IRR POUR H2O 1500ML

## (undated) DEVICE — SUT PLAIN GUT 4-0 30" C-13

## (undated) DEVICE — POSITIONER PINK PAD PIGAZZI SYSTEM

## (undated) DEVICE — SUT DERMABOND 0.7ML

## (undated) DEVICE — WRAP COMPRESSION CALF MED

## (undated) DEVICE — PREP CHLORAPREP ORANGE 2PCT 26ML